# Patient Record
Sex: FEMALE | Race: BLACK OR AFRICAN AMERICAN | NOT HISPANIC OR LATINO | Employment: PART TIME | URBAN - METROPOLITAN AREA
[De-identification: names, ages, dates, MRNs, and addresses within clinical notes are randomized per-mention and may not be internally consistent; named-entity substitution may affect disease eponyms.]

---

## 2019-06-13 PROBLEM — F25.1 SCHIZOAFFECTIVE DISORDER, DEPRESSIVE TYPE: Status: ACTIVE | Noted: 2019-06-13

## 2020-09-01 PROBLEM — Z00.00 ROUTINE ADULT HEALTH MAINTENANCE: Status: ACTIVE | Noted: 2020-09-01

## 2020-09-01 PROBLEM — D75.839 THROMBOCYTOSIS: Status: ACTIVE | Noted: 2020-09-01

## 2020-10-06 PROBLEM — G47.00 INSOMNIA: Status: ACTIVE | Noted: 2020-10-06

## 2020-11-12 PROBLEM — F25.1 SCHIZOAFFECTIVE DISORDER, DEPRESSIVE TYPE: Chronic | Status: ACTIVE | Noted: 2019-06-13

## 2020-12-07 PROBLEM — Z00.00 ROUTINE ADULT HEALTH MAINTENANCE: Status: RESOLVED | Noted: 2020-09-01 | Resolved: 2020-12-07

## 2021-01-22 PROBLEM — F33.3 MDD (MAJOR DEPRESSIVE DISORDER), RECURRENT, SEVERE, WITH PSYCHOSIS: Status: ACTIVE | Noted: 2019-06-13

## 2021-01-22 PROBLEM — F33.3 MDD (MAJOR DEPRESSIVE DISORDER), RECURRENT, SEVERE, WITH PSYCHOSIS: Chronic | Status: ACTIVE | Noted: 2019-06-13

## 2021-05-12 ENCOUNTER — PATIENT MESSAGE (OUTPATIENT)
Dept: RESEARCH | Facility: HOSPITAL | Age: 21
End: 2021-05-12

## 2021-11-01 PROBLEM — T74.21XA SEXUAL ASSAULT OF ADULT: Status: ACTIVE | Noted: 2021-11-01

## 2021-11-01 PROBLEM — R55 SYNCOPE: Status: ACTIVE | Noted: 2021-11-01

## 2022-05-02 PROBLEM — F31.9 BIPOLAR DEPRESSION: Chronic | Status: ACTIVE | Noted: 2019-06-13

## 2022-05-02 PROBLEM — F31.9 BIPOLAR DEPRESSION: Status: ACTIVE | Noted: 2019-06-13

## 2022-11-22 PROBLEM — F12.20 MARIJUANA DEPENDENCE: Status: ACTIVE | Noted: 2022-11-22

## 2022-11-22 PROBLEM — R45.851 SUICIDAL IDEATIONS: Status: ACTIVE | Noted: 2022-11-22

## 2022-11-22 PROBLEM — F31.9 BIPOLAR DEPRESSION: Chronic | Status: RESOLVED | Noted: 2019-06-13 | Resolved: 2022-11-22

## 2022-11-22 PROBLEM — F31.5 BIPOLAR I DISORDER, CURRENT OR MOST RECENT EPISODE DEPRESSED, WITH PSYCHOTIC FEATURES: Status: ACTIVE | Noted: 2022-11-22

## 2022-11-22 PROBLEM — M54.50 LOWER BACK PAIN: Status: ACTIVE | Noted: 2022-11-22

## 2022-11-26 PROBLEM — R45.851 SUICIDAL IDEATIONS: Status: RESOLVED | Noted: 2022-11-22 | Resolved: 2022-11-26

## 2023-04-08 PROBLEM — R55 SYNCOPE: Status: RESOLVED | Noted: 2021-11-01 | Resolved: 2023-04-08
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
## 2024-01-22 PROBLEM — F43.10 PTSD (POST-TRAUMATIC STRESS DISORDER): Status: ACTIVE | Noted: 2024-01-22

## 2024-01-22 PROBLEM — F12.10 CANNABIS ABUSE: Status: ACTIVE | Noted: 2024-01-22

## 2024-02-22 PROBLEM — F33.1 MAJOR DEPRESSIVE DISORDER, RECURRENT EPISODE, MODERATE WITH ANXIOUS DISTRESS: Status: ACTIVE | Noted: 2024-02-22

## 2024-03-22 PROBLEM — F31.62 BIPOLAR 1 DISORDER, MIXED, MODERATE: Status: ACTIVE | Noted: 2024-03-22

## 2024-03-22 PROBLEM — F33.1 MAJOR DEPRESSIVE DISORDER, RECURRENT EPISODE, MODERATE WITH ANXIOUS DISTRESS: Status: RESOLVED | Noted: 2024-02-22 | Resolved: 2024-03-22

## 2024-06-13 PROBLEM — F31.4 BIPOLAR 1 DISORDER, DEPRESSED, SEVERE: Status: ACTIVE | Noted: 2024-06-13

## 2024-06-14 PROBLEM — D50.9 IRON DEFICIENCY ANEMIA: Status: ACTIVE | Noted: 2024-06-14

## 2024-06-14 PROBLEM — F31.63 SEVERE MIXED BIPOLAR 1 DISORDER WITHOUT PSYCHOSIS: Status: ACTIVE | Noted: 2024-03-22

## 2024-07-01 PROBLEM — R45.850 HOMICIDAL IDEATIONS: Status: ACTIVE | Noted: 2024-07-01

## 2024-07-23 PROBLEM — R45.850 HOMICIDAL IDEATIONS: Status: RESOLVED | Noted: 2024-07-01 | Resolved: 2024-07-23

## 2024-07-24 PROBLEM — F31.63 SEVERE MIXED BIPOLAR 1 DISORDER WITHOUT PSYCHOSIS: Status: RESOLVED | Noted: 2024-03-22 | Resolved: 2024-07-24

## 2024-07-24 PROBLEM — F31.4 BIPOLAR 1 DISORDER, DEPRESSED, SEVERE: Status: RESOLVED | Noted: 2024-06-13 | Resolved: 2024-07-24

## 2024-07-24 PROBLEM — F33.41 MAJOR DEPRESSIVE DISORDER, RECURRENT EPISODE, IN PARTIAL REMISSION: Status: ACTIVE | Noted: 2024-07-24

## 2024-07-29 ENCOUNTER — SOCIAL WORK (OUTPATIENT)
Dept: ADMINISTRATIVE | Facility: OTHER | Age: 24
End: 2024-07-29

## 2024-07-29 NOTE — PROGRESS NOTES
Sw received a consult to assist with counseling services. Sw called and spoke to Patient ([phone]). She is agreeable to counseling. Lolis faxed a referral to [facility] to review.    Mandi Casas LCSW    [phone]

## 2024-08-01 ENCOUNTER — SOCIAL WORK (OUTPATIENT)
Dept: ADMINISTRATIVE | Facility: OTHER | Age: 24
End: 2024-08-01

## 2024-08-01 NOTE — PROGRESS NOTES
Lolis called [facility] to follow-up on the counseling referral. Eva reports Patient has been contacted and scheduled an assessment. Lolis verbalized appreciation.    [facility]  [address]  [place], [place]  [phone]  Appt:  8/6/2024    Mandi Casas LCSW    [phone]